# Patient Record
Sex: FEMALE | Race: WHITE | Employment: OTHER | URBAN - METROPOLITAN AREA
[De-identification: names, ages, dates, MRNs, and addresses within clinical notes are randomized per-mention and may not be internally consistent; named-entity substitution may affect disease eponyms.]

---

## 2017-01-21 ENCOUNTER — HOSPITAL ENCOUNTER (EMERGENCY)
Age: 72
Discharge: HOME | End: 2017-01-21
Attending: EMERGENCY MEDICINE | Admitting: EMERGENCY MEDICINE
Payer: MEDICARE

## 2017-01-21 VITALS
OXYGEN SATURATION: 97 % | SYSTOLIC BLOOD PRESSURE: 208 MMHG | RESPIRATION RATE: 18 BRPM | WEIGHT: 155.42 LBS | HEIGHT: 60 IN | TEMPERATURE: 97.8 F | DIASTOLIC BLOOD PRESSURE: 108 MMHG | HEART RATE: 93 BPM | BODY MASS INDEX: 30.51 KG/M2

## 2017-01-21 DIAGNOSIS — N39.0 URINARY TRACT INFECTION WITH HEMATURIA, SITE UNSPECIFIED: Primary | ICD-10-CM

## 2017-01-21 DIAGNOSIS — R31.9 URINARY TRACT INFECTION WITH HEMATURIA, SITE UNSPECIFIED: Primary | ICD-10-CM

## 2017-01-21 LAB
APPEARANCE UR: ABNORMAL
BACTERIA URNS QL MICRO: ABNORMAL /HPF
BILIRUB UR QL: NEGATIVE
COLOR UR: ABNORMAL
EPITH CASTS URNS QL MICRO: ABNORMAL /LPF
GLUCOSE UR STRIP.AUTO-MCNC: NEGATIVE MG/DL
HGB UR QL STRIP: ABNORMAL
KETONES UR QL STRIP.AUTO: NEGATIVE MG/DL
LEUKOCYTE ESTERASE UR QL STRIP.AUTO: ABNORMAL
NITRITE UR QL STRIP.AUTO: NEGATIVE
PH UR STRIP: 6 [PH] (ref 5–8)
PROT UR STRIP-MCNC: 30 MG/DL
RBC #/AREA URNS HPF: ABNORMAL /HPF (ref 0–5)
SP GR UR REFRACTOMETRY: 1 (ref 1–1.03)
UA: UC IF INDICATED,UAUC: ABNORMAL
UROBILINOGEN UR QL STRIP.AUTO: 0.2 EU/DL (ref 0.2–1)
WBC URNS QL MICRO: ABNORMAL /HPF (ref 0–4)

## 2017-01-21 PROCEDURE — 96372 THER/PROPH/DIAG INJ SC/IM: CPT

## 2017-01-21 PROCEDURE — 74011250636 HC RX REV CODE- 250/636: Performed by: EMERGENCY MEDICINE

## 2017-01-21 PROCEDURE — 87086 URINE CULTURE/COLONY COUNT: CPT | Performed by: EMERGENCY MEDICINE

## 2017-01-21 PROCEDURE — 74011000250 HC RX REV CODE- 250: Performed by: EMERGENCY MEDICINE

## 2017-01-21 PROCEDURE — 99283 EMERGENCY DEPT VISIT LOW MDM: CPT

## 2017-01-21 PROCEDURE — 87186 SC STD MICRODIL/AGAR DIL: CPT | Performed by: EMERGENCY MEDICINE

## 2017-01-21 PROCEDURE — 81001 URINALYSIS AUTO W/SCOPE: CPT | Performed by: EMERGENCY MEDICINE

## 2017-01-21 PROCEDURE — 87077 CULTURE AEROBIC IDENTIFY: CPT | Performed by: EMERGENCY MEDICINE

## 2017-01-21 RX ORDER — CEPHALEXIN 500 MG/1
500 CAPSULE ORAL 3 TIMES DAILY
Qty: 21 CAP | Refills: 0 | Status: SHIPPED | OUTPATIENT
Start: 2017-01-21 | End: 2017-01-28

## 2017-01-21 RX ADMIN — LIDOCAINE HYDROCHLORIDE 1 G: 10 INJECTION, SOLUTION EPIDURAL; INFILTRATION; INTRACAUDAL; PERINEURAL at 03:46

## 2017-01-21 NOTE — ED NOTES
About 2200 patient started feeling burning and frequency, started drinking lots of water and cranberrry juice. Has had UTIs in the past with similar symptoms. Patient came in because she could not go to the bathroom.

## 2017-01-21 NOTE — ED PROVIDER NOTES
HPI Comments: Ivelisse Carey is a 66 y.o. female who presents ambulatory to the ED c/o dysuria, urinary frequency and urgency for the past 4 hours. She notes hx of similar symptoms with prior UTIs, most recent infection occurring 4 months ago. Pt specifically denies fevers, chills, nausea, vomiting, vaginal discharge, or abdominal pain    PCP: Rufino Connell MD    There are no other complaints, changes or physical findings at this time. The history is provided by the patient.         Past Medical History:   Diagnosis Date    ADD (attention deficit disorder)     Asthma      chronic cough    Cataracts, bilateral     Depression with anxiety     Essential hypertension, benign 10/8/2009    GERD (gastroesophageal reflux disease)     History of uterine fibroid     Hyperlipidemia     Ocular migraine     YOMAIRA (obstructive sleep apnea)      does not use her c-pap    Osteoarthrosis involving multiple sites 3/4/2013    Osteopenia 10/8/2009    Rosacea     Stroke ([facility] [address]     Urge incontinence of urine     Urolithiasis     Urolithiasis        Past Surgical History:   Procedure Laterality Date    Endoscopy, colon, diagnostic       2009  clear    Hx appendectomy      Hx urological       stone removal    Hx dilation and curettage  1988    Hx tonsillectomy  1947    Pr breast surgery procedure unlisted  1991     biopsy: benign left    Hx orthopaedic Right 03/2013     TKR    Hx orthopaedic Left 09/2013     TKR         Family History:   Problem Relation Age of Onset    Arthritis-rheumatoid Mother     Dementia Mother     Cancer Father      prostate,liver    Hypertension Brother     Arthritis-rheumatoid Brother      oste0    Cancer Brother     Hypertension Brother     Hypertension Brother     Stroke Maternal Aunt     Malignant Hyperthermia Neg Hx     Pseudocholinesterase Deficiency Neg Hx     Delayed Awakening Neg Hx     Post-op Nausea/Vomiting Neg Hx     Emergence Delirium Neg Hx     Post-op Cognitive Dysfunction Neg Hx        Social History     Social History    Marital status:      Spouse name: N/A    Number of children: N/A    Years of education: N/A     Occupational History    Not on file. Social History Main Topics    Smoking status: Former Smoker     Years: 3.00     Quit date: 9/16/1963    Smokeless tobacco: Never Used    Alcohol use No    Drug use: No    Sexual activity: No     Other Topics Concern    Not on file     Social History Narrative         ALLERGIES: Review of patient's allergies indicates no known allergies. Review of Systems   Constitutional: Negative for activity change, appetite change, chills, fatigue and fever. HENT: Negative. Negative for congestion, rhinorrhea and sore throat. Respiratory: Negative. Negative for cough, shortness of breath and wheezing. Cardiovascular: Negative. Negative for chest pain and leg swelling. Gastrointestinal: Negative. Negative for abdominal distention, abdominal pain, constipation, diarrhea, nausea and vomiting. Endocrine: Negative. Genitourinary: Positive for dysuria, frequency and urgency. Negative for difficulty urinating, menstrual problem, vaginal bleeding and vaginal discharge. Musculoskeletal: Negative. Negative for arthralgias, joint swelling and myalgias. Skin: Negative. Negative for rash. Neurological: Negative. Negative for dizziness, weakness, light-headedness and headaches. Psychiatric/Behavioral: Negative. Vitals:    01/21/17 0211   BP: (!) 208/108   Pulse: 93   Resp: 18   Temp: 97.8 °F (36.6 °C)   SpO2: 97%   Weight: 70.5 kg (155 lb 6.8 oz)   Height: 5' (1.524 m)            Physical Exam   Constitutional: She is oriented to person, place, and time. She appears well-developed and well-nourished. HENT:   Head: Atraumatic. Eyes: EOM are normal.   Cardiovascular: Normal rate, regular rhythm, normal heart sounds and intact distal pulses.   Exam reveals no gallop and no friction rub. No murmur heard. Pulmonary/Chest: Effort normal and breath sounds normal. No respiratory distress. She has no wheezes. She has no rales. She exhibits no tenderness. Abdominal: Soft. Bowel sounds are normal. She exhibits no distension and no mass. There is no tenderness. There is no rebound and no guarding. Musculoskeletal: Normal range of motion. She exhibits no edema or tenderness. Neurological: She is oriented to person, place, and time. Skin: Skin is warm. Psychiatric: She has a normal mood and affect. Nursing note and vitals reviewed. MDM  Number of Diagnoses or Management Options  Diagnosis management comments: Likely UTI, cystitis       Amount and/or Complexity of Data Reviewed  Clinical lab tests: ordered and reviewed  Review and summarize past medical records: yes    Patient Progress  Patient progress: stable    ED Course       Procedures    LABORATORY TESTS:  Recent Results (from the past 12 hour(s))   URINALYSIS W/ REFLEX CULTURE    Collection Time: 01/21/17  2:28 AM   Result Value Ref Range    Color YELLOW/STRAW      Appearance CLOUDY (A) CLEAR      Specific gravity 1.005 1.003 - 1.030      pH (UA) 6.0 5.0 - 8.0      Protein 30 (A) NEG mg/dL    Glucose NEGATIVE  NEG mg/dL    Ketone NEGATIVE  NEG mg/dL    Bilirubin NEGATIVE  NEG      Blood LARGE (A) NEG      Urobilinogen 0.2 0.2 - 1.0 EU/dL    Nitrites NEGATIVE  NEG      Leukocyte Esterase LARGE (A) NEG      WBC 20-50 0 - 4 /hpf    RBC 20-50 0 - 5 /hpf    Epithelial cells FEW FEW /lpf    Bacteria 1+ (A) NEG /hpf    UA:UC IF INDICATED URINE CULTURE ORDERED (A) CNI         MEDICATIONS GIVEN:  Medications   cefTRIAXone (ROCEPHIN) 1 g in lidocaine (PF) (XYLOCAINE) 10 mg/mL (1 %) IM injection (not administered)       IMPRESSION:  1. Urinary tract infection with hematuria, site unspecified        PLAN:  1.  Discharge home   Current Discharge Medication List      START taking these medications    Details   cephALEXin (Uri Frausto) 500 mg capsule Take 1 Cap by mouth three (3) times daily for 7 days. Qty: 21 Cap, Refills: 0         CONTINUE these medications which have NOT CHANGED    Details   aspirin (ASPIRIN) 325 mg tablet Take 325 mg by mouth daily. butalbital-acetaminophen-caffeine (FIORICET, ESGIC) -40 mg per tablet Take 1 Tab by mouth. simvastatin (ZOCOR) 40 mg tablet 40 mg.      methylphenidate ER 36 mg 24 hr tab Take 36 mg by mouth every morning. fluticasone (FLONASE) 50 mcg/actuation nasal spray 2 Sprays by Both Nostrils route daily. losartan (COZAAR) 100 mg tablet Take 100 mg by mouth every evening. meclizine (ANTIVERT) 25 mg tablet Take 1 Tab by mouth three (3) times daily as needed for 30 doses. Qty: 30 Tab, Refills: 0    Associated Diagnoses: Vertigo           2. Follow-up Information     Follow up With Details Comments Adrian Montaño MD In 1 week  [address] [address]  [phone]      \[facility]\"" EMERGENCY DEPT  As needed, If symptoms worsen [facility]  [address] [place] [address]  [phone]      Return to ED if worse     DISCHARGE NOTE  3:28 AM  The patient has been re-evaluated and is ready for discharge. Reviewed available results with patient. Counseled pt on diagnosis and care plan. Pt has expressed understanding, and all questions have been answered. Pt agrees with plan and agrees to F/U as recommended, or return to the ED if their sxs worsen. Discharge instructions have been provided and explained to the pt, along with reasons to return to the ED. This note is prepared by Elsa Ellison, acting as Scribe for Angie Franco MD.    Angie Franco MD: The scribe's documentation has been prepared under my direction and personally reviewed by me in its entirety.  I confirm that the note above accurately reflects all work, treatment, procedures, and medical decision making performed by me.

## 2017-01-21 NOTE — ED NOTES
Medicated with antibiotics IM as ordered. Provider at bedside for dispo and follow up. Discharge plan reviewed and paperwork signed, teaching demonstrated and read back, pain level within manageable comfortable limits, ambulatory to exit, gait steady, safety maintained.

## 2017-01-21 NOTE — DISCHARGE INSTRUCTIONS
Urinary Tract Infection in Women: Care Instructions  Your Care Instructions    A urinary tract infection, or UTI, is a general term for an infection anywhere between the kidneys and the urethra (where urine comes out). Most UTIs are bladder infections. They often cause pain or burning when you urinate. UTIs are caused by bacteria and can be cured with antibiotics. Be sure to complete your treatment so that the infection goes away. Follow-up care is a key part of your treatment and safety. Be sure to make and go to all appointments, and call your doctor if you are having problems. It's also a good idea to know your test results and keep a list of the medicines you take. How can you care for yourself at home? · Take your antibiotics as directed. Do not stop taking them just because you feel better. You need to take the full course of antibiotics. · Drink extra water and other fluids for the next day or two. This may help wash out the bacteria that are causing the infection. (If you have kidney, heart, or liver disease and have to limit fluids, talk with your doctor before you increase your fluid intake.)  · Avoid drinks that are carbonated or have caffeine. They can irritate the bladder. · Urinate often. Try to empty your bladder each time. · To relieve pain, take a hot bath or lay a heating pad set on low over your lower belly or genital area. Never go to sleep with a heating pad in place. To prevent UTIs  · Drink plenty of water each day. This helps you urinate often, which clears bacteria from your system. (If you have kidney, heart, or liver disease and have to limit fluids, talk with your doctor before you increase your fluid intake.)  · Consider adding cranberry juice to your diet. · Urinate when you need to. · Urinate right after you have sex. · Change sanitary pads often. · Avoid douches, bubble baths, feminine hygiene sprays, and other feminine hygiene products that have deodorants.   · After going to the bathroom, wipe from front to back. When should you call for help? Call your doctor now or seek immediate medical care if:  · Symptoms such as fever, chills, nausea, or vomiting get worse or appear for the first time. · You have new pain in your back just below your rib cage. This is called flank pain. · There is new blood or pus in your urine. · You have any problems with your antibiotic medicine. Watch closely for changes in your health, and be sure to contact your doctor if:  · You are not getting better after taking an antibiotic for 2 days. · Your symptoms go away but then come back. Where can you learn more? Go to http://violet-pa.info/. Enter A752 in the search box to learn more about \"Urinary Tract Infection in Women: Care Instructions. \"  Current as of: August 12, 2016  Content Version: 11.1  © 4638-0435 [employer]. Care instructions adapted under license by [employer] (which disclaims liability or warranty for this information). If you have questions about a medical condition or this instruction, always ask your healthcare professional. [address] any warranty or liability for your use of this information.

## 2017-01-23 LAB
BACTERIA SPEC CULT: NORMAL
CC UR VC: NORMAL
SERVICE CMNT-IMP: NORMAL

## 2017-03-27 ENCOUNTER — DOCUMENTATION ONLY (OUTPATIENT)
Dept: SLEEP MEDICINE | Age: 72
End: 2017-03-27

## 2017-03-27 NOTE — PROGRESS NOTES
Patient came in office not happy with cpap or mask, advised patient to contact dme [facility]). Patient not sure if dme could help offered to make appointment with doctor for other treatment options, patient refused. Advised patient will call roxana at [facility]  And have her call her back. Shazia Patel at [facility], she will call about about mask issue.

## 2017-04-12 ENCOUNTER — TELEPHONE (OUTPATIENT)
Dept: SLEEP MEDICINE | Age: 72
End: 2017-04-12

## 2017-04-12 NOTE — TELEPHONE ENCOUNTER
Patient called to inform us that DME we referred to her ([address] was horrible. She said she called to request for mask of her choice and she was asked to called [facility] office and they could not help her there. [facility] office told her that they should not have asked her to call here because they would not be able to help. She was very upset that she was transferred back and forth to [facility] office. Finally she was scheduled for mask fitting and then she asked if they could check her machine. They told her that she needed to make another appointment for that. Patient called to let us know how terrible service [facility] offered so that we are aware of how poor their service is. She said because of the DME she already has made an appointment with different Sleep Dr and would want to change the DME as well. I told her if she was satisfied with Dr Yovany Simpsno, then we can re-send the order to different company [facility]). She said at this time she will stick with her new plan. If needed be she will call back again. Note: We had initially faxed her order to [facility] and then informed her that we have faxed the order to [facility]. She wanted order be faxed to [address] Then re-faxed it to [facility].

## 2017-05-05 NOTE — TELEPHONE ENCOUNTER
Briefed Shiela Telles at [facility] about patient complaint and decided to change Sleep Dr and DME. Shiela Telles said she would looked into what happened. Monitor summary  SR   [id number]  0.16/0.08/0.36

## 2018-03-08 ENCOUNTER — HOSPITAL ENCOUNTER (OUTPATIENT)
Dept: LAB | Age: 73
Discharge: HOME | End: 2018-03-08

## 2018-08-03 ENCOUNTER — HOSPITAL ENCOUNTER (OUTPATIENT)
Dept: MRI IMAGING | Age: 73
Discharge: HOME | End: 2018-08-03
Attending: ORTHOPAEDIC SURGERY
Payer: MEDICARE

## 2018-08-03 DIAGNOSIS — M25.511 ACUTE PAIN OF RIGHT SHOULDER: ICD-10-CM

## 2018-08-03 DIAGNOSIS — S46.011A TRAUMATIC TEAR OF RIGHT ROTATOR CUFF, INITIAL ENCOUNTER: ICD-10-CM

## 2018-08-03 PROCEDURE — 73221 MRI JOINT UPR EXTREM W/O DYE: CPT

## 2020-02-13 NOTE — PERIOP NOTES
[address]  Endoscopy Preprocedure Instructions      1. On the day of your surgery, please report to registration located on the 2nd floor of the  [facility]. yes    2. You must have a responsible adult to drive you to the hospital, stay at the hospital during your procedure and drive you home. If they leave your procedure will not be started (no exceptions). yes    3. Do not have anything to eat or drink (including water, gum, mints, coffee, and juice) after midnight. This does not apply to the medications you were instructed to take by your physician. yesIf you are currently taking Plavix, Coumadin, Aspirin, or other blood-thinning agents, contact your physician for special instructions. yes,    4. If you are having a procedure that requires bowel prep: We recommend that you have only clear liquids the day before your procedure and begin your bowel prep by 5:00 pm.  You may continue to drink clear liquids until midnight. If for any reason you are not able to complete your prep please notify your physician immediately. yes    5. Have a list of all current medications, including vitamins, herbal supplements and any other over the counter medications. yes    6. If you wear glasses, contacts, dentures and/or hearing aids, they may be removed prior to procedure, please bring a case to store them in. yes    7. You should understand that if you do not follow these instructions your procedure may be cancelled. If your physical condition changes (I.e. fever, cold or flu) please contact your doctor as soon as possible. 8. It is important that you be on time.   If for any reason you are unable to keep your appointment please call [phone] the day of or your physicians office prior to your scheduled procedure

## 2020-02-14 ENCOUNTER — HOSPITAL ENCOUNTER (OUTPATIENT)
Age: 75
Setting detail: OUTPATIENT SURGERY
Discharge: HOME | End: 2020-02-14
Attending: SPECIALIST | Admitting: SPECIALIST
Payer: MEDICARE

## 2020-02-14 ENCOUNTER — ANESTHESIA EVENT (OUTPATIENT)
Dept: ENDOSCOPY | Age: 75
End: 2020-02-14
Payer: MEDICARE

## 2020-02-14 ENCOUNTER — ANESTHESIA (OUTPATIENT)
Dept: ENDOSCOPY | Age: 75
End: 2020-02-14
Payer: MEDICARE

## 2020-02-14 VITALS
OXYGEN SATURATION: 97 % | HEIGHT: 60 IN | BODY MASS INDEX: 29 KG/M2 | RESPIRATION RATE: 17 BRPM | SYSTOLIC BLOOD PRESSURE: 138 MMHG | TEMPERATURE: 97.9 F | HEART RATE: 69 BPM | DIASTOLIC BLOOD PRESSURE: 80 MMHG | WEIGHT: 147.71 LBS

## 2020-02-14 PROCEDURE — 74011250636 HC RX REV CODE- 250/636: Performed by: NURSE ANESTHETIST, CERTIFIED REGISTERED

## 2020-02-14 PROCEDURE — 88305 TISSUE EXAM BY PATHOLOGIST: CPT

## 2020-02-14 PROCEDURE — 77030013992 HC SNR POLYP ENDOSC BSC -B: Performed by: SPECIALIST

## 2020-02-14 PROCEDURE — 77030021593 HC FCPS BIOP ENDOSC BSC -A: Performed by: SPECIALIST

## 2020-02-14 PROCEDURE — 74011000250 HC RX REV CODE- 250: Performed by: NURSE ANESTHETIST, CERTIFIED REGISTERED

## 2020-02-14 PROCEDURE — 76060000031 HC ANESTHESIA FIRST 0.5 HR: Performed by: SPECIALIST

## 2020-02-14 PROCEDURE — 76040000019: Performed by: SPECIALIST

## 2020-02-14 RX ORDER — DEXTROMETHORPHAN/PSEUDOEPHED 2.5-7.5/.8
1.2 DROPS ORAL
Status: DISCONTINUED | OUTPATIENT
Start: 2020-02-14 | End: 2020-02-14 | Stop reason: HOSPADM

## 2020-02-14 RX ORDER — SODIUM CHLORIDE 9 MG/ML
INJECTION, SOLUTION INTRAVENOUS
Status: DISCONTINUED | OUTPATIENT
Start: 2020-02-14 | End: 2020-02-14 | Stop reason: HOSPADM

## 2020-02-14 RX ORDER — ONDANSETRON 2 MG/ML
INJECTION INTRAMUSCULAR; INTRAVENOUS AS NEEDED
Status: DISCONTINUED | OUTPATIENT
Start: 2020-02-14 | End: 2020-02-14 | Stop reason: HOSPADM

## 2020-02-14 RX ORDER — PROPOFOL 10 MG/ML
INJECTION, EMULSION INTRAVENOUS AS NEEDED
Status: DISCONTINUED | OUTPATIENT
Start: 2020-02-14 | End: 2020-02-14 | Stop reason: HOSPADM

## 2020-02-14 RX ORDER — SODIUM CHLORIDE 9 MG/ML
50 INJECTION, SOLUTION INTRAVENOUS CONTINUOUS
Status: DISCONTINUED | OUTPATIENT
Start: 2020-02-14 | End: 2020-02-14 | Stop reason: HOSPADM

## 2020-02-14 RX ORDER — MIDAZOLAM HYDROCHLORIDE 1 MG/ML
.25-5 INJECTION, SOLUTION INTRAMUSCULAR; INTRAVENOUS AS NEEDED
Status: DISCONTINUED | OUTPATIENT
Start: 2020-02-14 | End: 2020-02-14 | Stop reason: HOSPADM

## 2020-02-14 RX ORDER — PROPOFOL 10 MG/ML
INJECTION, EMULSION INTRAVENOUS
Status: DISCONTINUED | OUTPATIENT
Start: 2020-02-14 | End: 2020-02-14 | Stop reason: HOSPADM

## 2020-02-14 RX ORDER — FENTANYL CITRATE 50 UG/ML
25 INJECTION, SOLUTION INTRAMUSCULAR; INTRAVENOUS AS NEEDED
Status: DISCONTINUED | OUTPATIENT
Start: 2020-02-14 | End: 2020-02-14 | Stop reason: HOSPADM

## 2020-02-14 RX ORDER — NALOXONE HYDROCHLORIDE 0.4 MG/ML
0.4 INJECTION, SOLUTION INTRAMUSCULAR; INTRAVENOUS; SUBCUTANEOUS
Status: DISCONTINUED | OUTPATIENT
Start: 2020-02-14 | End: 2020-02-14 | Stop reason: HOSPADM

## 2020-02-14 RX ORDER — FLUMAZENIL 0.1 MG/ML
0.2 INJECTION INTRAVENOUS
Status: DISCONTINUED | OUTPATIENT
Start: 2020-02-14 | End: 2020-02-14 | Stop reason: HOSPADM

## 2020-02-14 RX ORDER — LIDOCAINE HYDROCHLORIDE 20 MG/ML
INJECTION, SOLUTION EPIDURAL; INFILTRATION; INTRACAUDAL; PERINEURAL AS NEEDED
Status: DISCONTINUED | OUTPATIENT
Start: 2020-02-14 | End: 2020-02-14 | Stop reason: HOSPADM

## 2020-02-14 RX ADMIN — ONDANSETRON HYDROCHLORIDE 4 MG: 2 SOLUTION INTRAMUSCULAR; INTRAVENOUS at 09:31

## 2020-02-14 RX ADMIN — SODIUM CHLORIDE: 9 INJECTION, SOLUTION INTRAVENOUS at 09:20

## 2020-02-14 RX ADMIN — PROPOFOL 100 MG: 10 INJECTION, EMULSION INTRAVENOUS at 09:33

## 2020-02-14 RX ADMIN — LIDOCAINE HYDROCHLORIDE 40 MG: 20 INJECTION, SOLUTION INTRAVENOUS at 09:39

## 2020-02-14 RX ADMIN — PROPOFOL 140 MCG/KG/MIN: 10 INJECTION, EMULSION INTRAVENOUS at 09:32

## 2020-02-14 NOTE — ROUTINE PROCESS
Real Cristiane  1945  [id number]    Situation:  Verbal report received from: [facility]  Procedure: Procedure(s):  COLONOSCOPY  ESOPHAGOGASTRODUODENOSCOPY (EGD)  ESOPHAGOGASTRODUODENAL (EGD) BIOPSY  ENDOSCOPIC POLYPECTOMY    Background:    Preoperative diagnosis: PERSONAL HX OF COLONIC POLYPS  Postoperative diagnosis: Gastritis  hemorrhoids  Transverse polyp  Diverticulosis    :  Dr. Villalta Peer  Assistant(s): Endoscopy Technician-1: Claudia Pepe  Endoscopy RN-1: Nandini Preciado RN    Specimens:   ID Type Source Tests Collected by Time Destination   1 : biopsy Preservative   Lubna Chaudhry MD 2/14/2020 6864 Pathology   2 : polyp Preservative Colon, Transverse  Lubna Chaudhry MD 2/14/2020 0945 Pathology     H. Pylori  no    Assessment:  Intra-procedure medications     Anesthesia gave intra-procedure sedation and medications, see anesthesia flow sheet yes    Intravenous fluids: NS@ KVO     Vital signs stable     Abdominal assessment: round and soft     Recommendation:  Discharge patient per MD order.   Family or Friend   Permission to share finding with family or friend yes

## 2020-02-14 NOTE — ANESTHESIA PREPROCEDURE EVALUATION
Relevant Problems   No relevant active problems       Anesthetic History     PONV          Review of Systems / Medical History  Patient summary reviewed, nursing notes reviewed and pertinent labs reviewed    Pulmonary        Sleep apnea    Asthma        Neuro/Psych       CVA  TIA and psychiatric history     Cardiovascular    Hypertension                   GI/Hepatic/Renal     GERD           Endo/Other        Arthritis     Other Findings            Physical Exam    Airway  Mallampati: II  TM Distance: 4 - 6 cm  Neck ROM: normal range of motion        Cardiovascular    Rhythm: regular  Rate: normal         Dental  No notable dental hx       Pulmonary  Breath sounds clear to auscultation               Abdominal         Other Findings            Anesthetic Plan    ASA: 3  Anesthesia type: MAC          Induction: Intravenous  Anesthetic plan and risks discussed with: Patient      Hx of ponv with egd/colonoscopy

## 2020-02-14 NOTE — INTERVAL H&P NOTE
Pre-Endoscopy H&P Update  Chief complaint/HPI/ROS:  The indication for the procedure, the patient's history and the patient's current medications are reviewed prior to the procedure and that data is reported on the H&P to which this document is attached. Any significant complaints with regard to organ systems will be noted, and if not mentioned then a review of systems is not contributory.   Past Medical History:   Diagnosis Date    ADD (attention deficit disorder)     Asthma     chronic cough    Cataracts, bilateral     Depression with anxiety     Essential hypertension, benign 10/8/2009    GERD (gastroesophageal reflux disease)     History of uterine fibroid     Hyperlipidemia     Ocular migraine     YOMAIRA (obstructive sleep apnea)     bipap    Osteoarthrosis involving multiple sites 3/4/2013    Osteopenia 10/8/2009    Rosacea     Stroke ([address]     Urge incontinence of urine     Urolithiasis     Urolithiasis       Past Surgical History:   Procedure Laterality Date    BREAST SURGERY PROCEDURE UNLISTED      biopsy: benign left    ENDOSCOPY, COLON, DIAGNOSTIC       Dr.Lyons martínez    HX APPENDECTOMY      HX DILATION AND CURETTAGE      HX ORTHOPAEDIC Right 2013    TKR    HX ORTHOPAEDIC Left 2013    TKR    HX TONSILLECTOMY      HX UROLOGICAL      stone removal     Social   Social History     Tobacco Use    Smoking status: Former Smoker     Years: 3.00     Last attempt to quit: 1963     Years since quittin.3    Smokeless tobacco: Never Used   Substance Use Topics    Alcohol use: No     Alcohol/week: 0.0 standard drinks      Family History   Problem Relation Age of Onset    Arthritis-rheumatoid Mother     Dementia Mother     Cancer Father         prostate,liver    Hypertension Brother     Arthritis-rheumatoid Brother         oste0    Cancer Brother     Hypertension Brother     Hypertension Brother     Stroke Maternal Aunt     Malignant Hyperthermia Neg Hx  Pseudocholinesterase Deficiency Neg Hx     Delayed Awakening Neg Hx     Post-op Nausea/Vomiting Neg Hx     Emergence Delirium Neg Hx     Post-op Cognitive Dysfunction Neg Hx       No Known Allergies   Prior to Admission Medications   Prescriptions Last Dose Informant Patient Reported? Taking?   aspirin (ASPIRIN) 325 mg tablet 2020  Yes Yes   Sig: Take 325 mg by mouth daily. butalbital-acetaminophen-caffeine (FIORICET, ESGIC) -40 mg per tablet 2020 at Unknown time  Yes Yes   Sig: Take 1 Tab by mouth. fluticasone (FLONASE) 50 mcg/actuation nasal spray Not Taking at Unknown time Self Yes No   Si Sprays by Both Nostrils route daily. losartan (COZAAR) 100 mg tablet 2020 at Unknown time Self Yes Yes   Sig: Take 100 mg by mouth every evening. meclizine (ANTIVERT) 25 mg tablet 2020 at Unknown time Self No Yes   Sig: Take 1 Tab by mouth three (3) times daily as needed for 30 doses. methylphenidate ER 36 mg 24 hr tab Not Taking at Unknown time Self Yes No   Sig: Take 36 mg by mouth every morning. simvastatin (ZOCOR) 40 mg tablet Not Taking at Unknown time  Yes No   Si mg. Facility-Administered Medications: None       PHYSICAL EXAM:  The patient is examined immediately prior to the procedure. Visit Vitals  /85   Pulse 76   Temp 98.4 °F (36.9 °C)   Resp 14   Ht 5' (1.524 m)   Wt 67 kg (147 lb 11.3 oz)   SpO2 96%   BMI 28.85 kg/m²     Gen: Appears comfortable, no distress. Pulm: comfortable respirations with no abnormal audible breath sounds  HEART: well perfused, no abnormal audible heart sounds  GI: abdomen flat. PLAN:  Informed consent discussion held, patient afforded an opportunity to ask questions and all questions answered. After being advised of the risks, benefits, and alternatives, the patient requested that we proceed and indicated so on a written consent form. Will proceed with procedure as planned.   Kimberly Mayer MD

## 2020-02-14 NOTE — ADDENDUM NOTE
Addendum  created 02/14/20 1012 by Pia Nunez CRNA    Intraprocedure Flowsheets edited, Intraprocedure Meds edited, Orders acknowledged in Narrator

## 2020-02-14 NOTE — PERIOP NOTES
Received report from [address] [place] [address], see anesthesia note. Scopes all washed at bedside by Abhishek Pierre. Pt transferred to recovery and report given to Armin Garcia regarding procedures, diagnosis, etc. Family brought to bedside and updated on POC. VSS, abdoman soft.

## 2020-02-14 NOTE — DISCHARGE INSTRUCTIONS
[facility] GEOFF Carmona MD  [phone]      February 14, 2020    Bobby Ward  YOB: 1945    COLONOSCOPY DISCHARGE INSTRUCTIONS    If there is redness at IV site you should apply warm compress to area. If redness or soreness persist contact Dr. Kenroy Carmona' or your primary care doctor. There may be a slight amount of blood passed from the rectum. Gaseous discomfort may develop, but walking, belching will help relieve this. You may not operate a vehicle for 12 hours  You may not operate machinery or dangerous appliances for rest of today  You may not drink alcoholic beverages for 12 hours  Avoid making any critical decisions for 24 hours    DIET:  You may resume your normal diet, but some patients find that heavy or large meals may lead to indigestion or vomiting. I suggest a light meal as first food intake. MEDICATIONS:  The use of some over-the-counter pain medication may lead to bleeding after colon biopsies or polyp removal.  Tylenol (also called acetaminophen) is safe to take even if you have had colonoscopy with polyp removal.  Based on the procedure you had today you may not safely take aspirin or aspirin-like products for the next ten (10) days. Remember that Tylenol (also called acetaminophen) is safe to take after colonoscopy even if you have had biopsies or polyps removed. ACTIVITY:  You may resume your normal household activities, but it is recommended that you spend the remainder of the day resting -  avoid any strenuous activity. CALL DR. Erica Le' OFFICE IF:  Increasing pain, nausea, vomiting  Abdominal distension (swelling)  Significant new or increased bleeding (oral or rectal)  Fever/Chills  Chest pain/shortness of breath                       Additional instructions:   Hold aspirin 10 days. We found one small polyp and removed it.   The stomach has mild redness but no ulcer or sulema.  I took biopsies from the stomach to look for the ulcer bacteria and I'll contact you with the biopsy results in about a week. It was an honor to be your doctor today. Please let me or my office staff know if you have any feedback about today's procedure. Kevin Munoz MD    Colonoscopy saves lives, and can prevent colon cancer. Everyone aged 48 or older needs colonoscopy.   Tell your family and friends: get the test!

## 2020-02-14 NOTE — INTERVAL H&P NOTE
On evaluation she complains of epigastric discomfort improved with meals, personal history of PUD, personal history of helicobacter pylori. Will perform EGD at same session of colonoscopy.   Jose Alejandro De La Cruz MD

## 2020-02-14 NOTE — PROCEDURES
[address]  [phone]      2020    Esophagogastroduodenoscopy & Colonoscopy Procedure Note  Theo David  : 1945  [address] [place] [address] Medical Record Number: [id number]      Indications:    Abdominal pain, epigastric Screening Colonoscopy   Referring Physician:  Sebas Szymanski MD  Anesthesia/Sedation: Conscious Sedation/Moderate Sedation/MAC  Endoscopist:  Dr. Kate Spivey  Complications:  None  Estimated Blood Loss:  None    Permit:  The indications, risks, benefits and alternatives were reviewed with the patient or their decision maker who was provided an opportunity to ask questions and all questions were answered. The specific risks of esophagogastroduodenoscopy with conscious sedation were reviewed, including but not limited to anesthetic complication, bleeding, adverse drug reaction, missed lesion, infection, IV site reactions, and intestinal perforation which would lead to the need for surgical repair. Alternatives to EGD and colonoscopy including radiographic imaging, observation without testing, or laboratory testing were reviewed as well as the limitations of those alternatives discussed. After considering the options and having all their questions answered, the patient or their decision maker provided both verbal and written consent to proceed. -----------EGD------------   Procedure in Detail:  After obtaining informed consent, positioning of the patient in the left lateral decubitus position, and conduction of a pre-procedure pause or \"time out\" the endoscope was introduced into the mouth and advanced to the duodenum. A careful inspection was made, and findings or interventions are described below. Findings:   Esophagus:normal  Stomach: Gastric erythema without significant erosion or ulceration.   Cold forceps biopsies taken for histology given her history of helicobacter pylori. Duodenum/jejunum: normal        ----------Colonoscopy-----------    Procedure in Detail:  After obtaining informed consent, positioning of the patient in the left lateral decubitus position, and conduction of a pre-procedure pause or \"time out\" the endoscope was introduced into the anus and advanced to the cecum, which was identified by the ileocecal valve and appendiceal orifice. The quality of the colonic preparation was good. A careful inspection was made as the colonoscope was withdrawn, findings and interventions are described below. Findings:   3mm polyp in the transverse colon removed with cold snare and all samples retrieved. There is diverticulosis in the sigmoid colon without complications such as bleeding, inflammatory change, or luminal narrowing. In the rectum, medium internal hemorrhoids are noted without bleeding.      ------------------------------  Specimens:    See above    Complications:   None; patient tolerated the procedure well. Impressions:  EGD:  Gastritis. Colonoscopy: Colon polyp, diverticulosis, and hemorrhoids. Recommendations:     - Await pathology. Thank you for entrusting me with this patient's care. Please do not hesitate to contact me with any questions or if I can be of assistance with any of your other patients' GI needs. Signed By: Margaret Angeles MD                        February 14, 2020    Surgical assistant none. Implants none unless specified.

## 2020-02-14 NOTE — ANESTHESIA POSTPROCEDURE EVALUATION
Procedure(s):  COLONOSCOPY  ESOPHAGOGASTRODUODENOSCOPY (EGD)  ESOPHAGOGASTRODUODENAL (EGD) BIOPSY  ENDOSCOPIC POLYPECTOMY. MAC    Anesthesia Post Evaluation      Multimodal analgesia: multimodal analgesia used between 6 hours prior to anesthesia start to PACU discharge  Patient location during evaluation: PACU  Patient participation: complete - patient participated  Level of consciousness: awake and alert  Pain management: adequate  Airway patency: patent  Anesthetic complications: no  Cardiovascular status: acceptable  Respiratory status: acceptable  Hydration status: acceptable  Post anesthesia nausea and vomiting:  none      Vitals Value Taken Time   /83 2/14/2020 10:05 AM   Temp 36.6 °C (97.9 °F) 2/14/2020 10:00 AM   Pulse 78 2/14/2020 10:06 AM   Resp 14 2/14/2020 10:06 AM   SpO2 97 % 2/14/2020 10:06 AM   Vitals shown include unvalidated device data.

## 2020-02-14 NOTE — H&P
66 y.o. female for open access colonoscopy for screening   Additional data for completion of the targeted pre-endoscopy H&P will be provided under 'H&P interval notes'. Please see that document which will be attached to this.   Magdiel Segundo MD  Last colonoscopy [address]  [place] small TA

## 2022-08-17 ENCOUNTER — OFFICE VISIT (OUTPATIENT)
Dept: ORTHOPEDIC SURGERY | Age: 77
End: 2022-08-17
Payer: MEDICARE

## 2022-08-17 DIAGNOSIS — M79.641 RIGHT HAND PAIN: ICD-10-CM

## 2022-08-17 DIAGNOSIS — M79.642 BILATERAL HAND PAIN: ICD-10-CM

## 2022-08-17 DIAGNOSIS — M19.041 PRIMARY OSTEOARTHRITIS, RIGHT HAND: Primary | ICD-10-CM

## 2022-08-17 DIAGNOSIS — M79.641 BILATERAL HAND PAIN: ICD-10-CM

## 2022-08-17 DIAGNOSIS — M19.042 PRIMARY OSTEOARTHRITIS, LEFT HAND: ICD-10-CM

## 2022-08-17 DIAGNOSIS — M18.0 PRIMARY OSTEOARTHRITIS OF BOTH FIRST CARPOMETACARPAL JOINTS: ICD-10-CM

## 2022-08-17 PROCEDURE — G9717 DOC PT DX DEP/BP F/U NT REQ: HCPCS | Performed by: ORTHOPAEDIC SURGERY

## 2022-08-17 PROCEDURE — G8756 NO BP MEASURE DOC: HCPCS | Performed by: ORTHOPAEDIC SURGERY

## 2022-08-17 PROCEDURE — G8536 NO DOC ELDER MAL SCRN: HCPCS | Performed by: ORTHOPAEDIC SURGERY

## 2022-08-17 PROCEDURE — 1101F PT FALLS ASSESS-DOCD LE1/YR: CPT | Performed by: ORTHOPAEDIC SURGERY

## 2022-08-17 PROCEDURE — G8427 DOCREV CUR MEDS BY ELIG CLIN: HCPCS | Performed by: ORTHOPAEDIC SURGERY

## 2022-08-17 PROCEDURE — G8399 PT W/DXA RESULTS DOCUMENT: HCPCS | Performed by: ORTHOPAEDIC SURGERY

## 2022-08-17 PROCEDURE — 1090F PRES/ABSN URINE INCON ASSESS: CPT | Performed by: ORTHOPAEDIC SURGERY

## 2022-08-17 PROCEDURE — 1123F ACP DISCUSS/DSCN MKR DOCD: CPT | Performed by: ORTHOPAEDIC SURGERY

## 2022-08-17 PROCEDURE — 99203 OFFICE O/P NEW LOW 30 MIN: CPT | Performed by: ORTHOPAEDIC SURGERY

## 2022-08-17 PROCEDURE — G8421 BMI NOT CALCULATED: HCPCS | Performed by: ORTHOPAEDIC SURGERY

## 2022-08-17 RX ORDER — ESCITALOPRAM OXALATE 20 MG/1
TABLET ORAL
COMMUNITY
Start: 2022-07-07

## 2022-08-17 RX ORDER — OMEPRAZOLE 20 MG/1
20 CAPSULE, DELAYED RELEASE ORAL DAILY
COMMUNITY
Start: 2022-06-13

## 2022-08-17 RX ORDER — ROSUVASTATIN CALCIUM 10 MG/1
10 TABLET, COATED ORAL DAILY
COMMUNITY
Start: 2022-05-26

## 2022-08-17 RX ORDER — AMLODIPINE BESYLATE 2.5 MG/1
2.5 TABLET ORAL DAILY
COMMUNITY
Start: 2022-05-26

## 2022-08-17 RX ORDER — EZETIMIBE 10 MG/1
10 TABLET ORAL DAILY
COMMUNITY
Start: 2022-07-07

## 2022-08-17 NOTE — PROGRESS NOTES
HPI: Prerna Sharma (: 1945) is a 64 y.o. female, patient, here for evaluation of the following chief complaint(s):    No chief complaint on file. Patient is seen today to evaluate her hands. She has chronic severe bilateral hand osteoarthritis especially with ulnar curvatures of the index and middle finger PIP joints more pronounced on the right than the left side. She has advanced bilateral thumb CMC basal joint arthritis but surprisingly has little pain. She overall has significant IP joint arthritis throughout each hand but is concerned more over the chronic curvature of the right index and middle PIP joints and the difficulty that creates with pinch. Vitals: There were no vitals taken for this visit. There is no height or weight on file to calculate BMI. No Known Allergies    Current Outpatient Medications   Medication Sig    amLODIPine (NORVASC) 2.5 mg tablet Take 2.5 mg by mouth daily. ezetimibe (ZETIA) 10 mg tablet Take 10 mg by mouth daily. rosuvastatin (CRESTOR) 10 mg tablet Take 10 mg by mouth daily. omeprazole (PRILOSEC) 20 mg capsule Take 20 mg by mouth daily. escitalopram oxalate (LEXAPRO) 20 mg tablet TAKE 1 TABLET BY MOUTH ONCE DAILY FOR 90 DAYS    aspirin (ASPIRIN) 325 mg tablet Take 325 mg by mouth daily. butalbital-acetaminophen-caffeine (FIORICET, ESGIC) -40 mg per tablet Take 1 Tab by mouth. simvastatin (ZOCOR) 40 mg tablet 40 mg.    losartan (COZAAR) 100 mg tablet Take 100 mg by mouth every evening. methylphenidate ER 36 mg 24 hr tab Take 36 mg by mouth every morning. fluticasone (FLONASE) 50 mcg/actuation nasal spray 2 Sprays by Both Nostrils route daily. meclizine (ANTIVERT) 25 mg tablet Take 1 Tab by mouth three (3) times daily as needed for 30 doses. No current facility-administered medications for this visit.        Past Medical History:   Diagnosis Date    ADD (attention deficit disorder)     Asthma     chronic cough Cataracts, bilateral     Depression with anxiety     Essential hypertension, benign 10/8/2009    GERD (gastroesophageal reflux disease)     History of uterine fibroid     Hyperlipidemia     Ocular migraine     YOMAIRA (obstructive sleep apnea)     bipap    Osteoarthrosis involving multiple sites 3/4/2013    Osteopenia 10/8/2009    Rosacea     Stroke ([address]     Urge incontinence of urine     Urolithiasis     Urolithiasis         Past Surgical History:   Procedure Laterality Date    COLONOSCOPY N/A 2020    COLONOSCOPY performed by Titi Rosenberg MD at [facility], COLON, DIAGNOSTIC       Dr.Lyons martínez    HX APPENDECTOMY      HX DILATION AND CURETTAGE      HX ORTHOPAEDIC Right 2013    TKR    HX ORTHOPAEDIC Left 2013    TKR    HX TONSILLECTOMY      HX UROLOGICAL      stone removal    [facility] BREAST SURGERY PROCEDURE UNLISTED      biopsy: benign left       Family History   Problem Relation Age of Onset    Arthritis-rheumatoid Mother     Dementia Mother     Cancer Father         prostate,liver    Hypertension Brother     Arthritis-rheumatoid Brother         oste0    Cancer Brother     Hypertension Brother     Hypertension Brother     Stroke Maternal Aunt     Malignant Hyperthermia Neg Hx     Pseudocholinesterase Deficiency Neg Hx     Delayed Awakening Neg Hx     Post-op Nausea/Vomiting Neg Hx     Emergence Delirium Neg Hx     Post-op Cognitive Dysfunction Neg Hx         Social History     Tobacco Use    Smoking status: Former     Years: 3.00     Types: Cigarettes     Quit date: 1963     Years since quittin.9    Smokeless tobacco: Never   Substance Use Topics    Alcohol use: No     Alcohol/week: 0.0 standard drinks    Drug use: No        Review of Systems   All other systems reviewed and are negative.            Physical Exam    The patient has bilateral hand deformities from osteoarthritis with 25 to 30 degree ulnarly angulated PIP joints of the right index and middle finger more severe than on the left side. She cannot make full complete composite fist due to the Rhett Neftali nodes at the DIP PIP joints and advanced arthritis in general.  She complains less involving the right ring and small finger PIP joints. She has chronic dorsal thumb carpometacarpal joint prominence with basal joint arthritis and some secondary mild hyperextension of the MP joints yet is only minimally symptomatic on the left versus the right thumb. Good sensation refill. Imaging:    XR Results (most recent):  Results from Appointment encounter on 08/17/22    XR HAND BILAT AP LAT OBL (MIN 3 V)    Narrative  AP, lateral and oblique x-ray of both hand x-rays were obtained. She has marked severe osteoarthritis with complete joint space collapse of multiple joints including left greater than right thumb CMC and STT joints, left index MP more than other MP joints, symmetric severe PIP DIP arthritis with PIP ulnar curvatures more on the right than left and near complete subluxation right small finger PIP joint. Osteopenia. No fractures. ASSESSMENT/PLAN:  Below is the assessment and plan developed based on review of pertinent history, physical exam, labs, studies, and medications. Patient examination was consistent with bilateral chronic severe hand osteoarthritis with deformities mostly involving the thumb CMC basal joints and especially the right more than left index and middle PIP joints with upwards of 30 degree ulnar deviation curvatures. I reviewed treatment options and answered her questions. While multiple joints radiographically could be treated with fusions and/or joint replacement arthroplasties I feel she would be best served with right index and middle PIP fusions with avoidance of any MP or DIP surgery on the specific fingers. Future consideration could be given for a PIP replacement arthroplasty of the ring finger and the fusion of the PIP of the small finger.   I reviewed risks that include but not limited to stiffness, pain, nerve or tendon damage and overall incomplete relief of pain and nonunion. Arrangements can be made for this to be performed on an outpatient basis at her convenience. 1. Primary osteoarthritis, right hand  2. Right hand pain  3. Primary osteoarthritis, left hand  4. Bilateral hand pain  -     XR HAND BILAT AP LAT OBL (MIN 3 V); Future  5. Primary osteoarthritis of both first carpometacarpal joints      Return for Follow-up 7 to 10 days after surgery. .    An electronic signature was used to authenticate this note.   -- Sandro Mendez MD

## 2022-08-17 NOTE — LETTER
8/17/2022    Patient: Eveline Restrepo   YOB: 1945   Date of Visit: 8/17/2022     Macy Genao MD  [place] [address] [phone]  Via Fax: [phone]    Dear Macy Genao MD,      Thank you for referring Ms. Irina John to [facility] for evaluation. My notes for this consultation are attached. If you have questions, please do not hesitate to call me. I look forward to following your patient along with you.       Sincerely,    Lexi Parada MD

## 2022-09-02 DIAGNOSIS — M19.041 PRIMARY OSTEOARTHRITIS, RIGHT HAND: Primary | ICD-10-CM

## 2022-09-14 DIAGNOSIS — M18.0 PRIMARY OSTEOARTHRITIS OF BOTH FIRST CARPOMETACARPAL JOINTS: Primary | ICD-10-CM

## 2022-09-14 DIAGNOSIS — M19.041 PRIMARY OSTEOARTHRITIS, RIGHT HAND: ICD-10-CM

## 2022-09-14 RX ORDER — HYDROCODONE BITARTRATE AND ACETAMINOPHEN 5; 325 MG/1; MG/1
1 TABLET ORAL
Qty: 15 TABLET | Refills: 0 | Status: SHIPPED | OUTPATIENT
Start: 2022-09-14 | End: 2022-09-17

## 2022-09-14 RX ORDER — HYDROCODONE BITARTRATE AND ACETAMINOPHEN 5; 325 MG/1; MG/1
1 TABLET ORAL
Qty: 15 TABLET | Refills: 0 | Status: SHIPPED | OUTPATIENT
Start: 2022-09-14 | End: 2022-09-14 | Stop reason: SDUPTHER

## 2022-09-21 NOTE — PROGRESS NOTES
HPI: Prerna Sharma (: 1945) is a 64 y.o. female, patient, here for evaluation of the following chief complaint(s):    Surgical Follow-up (Right index and middle finger PIP joint fusions on 9/15/22.)   Patient is seen today to evaluate her hands. She has chronic severe bilateral hand osteoarthritis especially with ulnar curvatures of the index and middle finger PIP joints more pronounced on the right than the left side. She has advanced bilateral thumb CMC basal joint arthritis but surprisingly has little pain. She overall has significant IP joint arthritis throughout each hand but is concerned more over the chronic curvature of the right index and middle PIP joints and the difficulty that creates with pinch. She underwent right index and middle finger PIP fusion surgery on 9/15/2022. Vitals: There were no vitals taken for this visit. There is no height or weight on file to calculate BMI. No Known Allergies    Current Outpatient Medications   Medication Sig    amLODIPine (NORVASC) 2.5 mg tablet Take 2.5 mg by mouth daily. ezetimibe (ZETIA) 10 mg tablet Take 10 mg by mouth daily. rosuvastatin (CRESTOR) 10 mg tablet Take 10 mg by mouth daily. omeprazole (PRILOSEC) 20 mg capsule Take 20 mg by mouth daily. escitalopram oxalate (LEXAPRO) 20 mg tablet TAKE 1 TABLET BY MOUTH ONCE DAILY FOR 90 DAYS    aspirin (ASPIRIN) 325 mg tablet Take 325 mg by mouth daily. butalbital-acetaminophen-caffeine (FIORICET, ESGIC) -40 mg per tablet Take 1 Tab by mouth. simvastatin (ZOCOR) 40 mg tablet 40 mg.    methylphenidate ER 36 mg 24 hr tab Take 36 mg by mouth every morning. fluticasone (FLONASE) 50 mcg/actuation nasal spray 2 Sprays by Both Nostrils route daily. losartan (COZAAR) 100 mg tablet Take 100 mg by mouth every evening. meclizine (ANTIVERT) 25 mg tablet Take 1 Tab by mouth three (3) times daily as needed for 30 doses.      No current facility-administered medications for this visit. Past Medical History:   Diagnosis Date    ADD (attention deficit disorder)     Asthma     chronic cough    Cataracts, bilateral     Depression with anxiety     Essential hypertension, benign 10/8/2009    GERD (gastroesophageal reflux disease)     History of uterine fibroid     Hyperlipidemia     Ocular migraine     YOMAIRA (obstructive sleep apnea)     bipap    Osteoarthrosis involving multiple sites 3/4/2013    Osteopenia 10/8/2009    Rosacea     Stroke ([address]     Urge incontinence of urine     Urolithiasis     Urolithiasis         Past Surgical History:   Procedure Laterality Date    COLONOSCOPY N/A 2020    COLONOSCOPY performed by Yusuf Herrera MD at [address], COLON, DIAGNOSTIC       Dr.Lyons martínez    HX APPENDECTOMY      HX DILATION AND CURETTAGE      HX ORTHOPAEDIC Right 2013    TKR    HX ORTHOPAEDIC Left 2013    TKR    HX TONSILLECTOMY      HX UROLOGICAL      stone removal    [place] BREAST SURGERY PROCEDURE UNLISTED      biopsy: benign left       Family History   Problem Relation Age of Onset    Arthritis-rheumatoid Mother     Dementia Mother     Cancer Father         prostate,liver    Hypertension Brother     Arthritis-rheumatoid Brother         oste0    Cancer Brother     Hypertension Brother     Hypertension Brother     Stroke Maternal Aunt     Malignant Hyperthermia Neg Hx     Pseudocholinesterase Deficiency Neg Hx     Delayed Awakening Neg Hx     Post-op Nausea/Vomiting Neg Hx     Emergence Delirium Neg Hx     Post-op Cognitive Dysfunction Neg Hx         Social History     Tobacco Use    Smoking status: Former     Years: 3.00     Types: Cigarettes     Quit date: 1963     Years since quittin.0    Smokeless tobacco: Never   Substance Use Topics    Alcohol use: No     Alcohol/week: 0.0 standard drinks    Drug use: No        Review of Systems   All other systems reviewed and are negative.            Physical Exam    The right hand index and middle finger wounds are healing well with good position and alignment of the fusion sites. No redness drainage or sign of infection. She has chronic dorsal thumb carpometacarpal joint prominence with basal joint arthritis and some secondary mild hyperextension of the MP joints yet is only minimally symptomatic on the left versus the right thumb. Good sensation refill. Imaging:    XR Results (most recent):  Results from Appointment encounter on 09/22/22    XR HAND RT MIN 3 V    Narrative  AP, lateral and oblique x-ray of the right hand shows the PIP fusions of the index and middle finger thus far progressing in good position and alignment with the known osteoarthritic changes at each joint space including throughout the hand and thumb CMC and PIPs of ring small that are ulnarly angulated and DIPs throughout. ASSESSMENT/PLAN:  Below is the assessment and plan developed based on review of pertinent history, physical exam, labs, studies, and medications. Patient examination was consistent with bilateral chronic severe hand osteoarthritis with deformities mostly involving the thumb CMC basal joints and especially the right more than left index and middle PIP joints with upwards of 30 degree ulnar deviation curvatures. I reviewed treatment options and answered her questions. While multiple joints radiographically could be treated with fusions and/or joint replacement arthroplasties I feel she would be best served with right index and middle PIP fusions with avoidance of any MP or DIP surgery on the specific fingers. Future consideration could be given for a PIP replacement arthroplasty of the ring finger and the fusion of the PIP of the small finger. She underwent right index and middle PIP joint fusions on 9/15/2022. She may continue with roxanna taping and gentle motion then strength recovery. Follow-up in 3 to 4 weeks.     1. Primary osteoarthritis, right hand  -     XR HAND RT MIN 3 V; Future  2. Primary osteoarthritis, left hand  3. Bilateral hand pain  4. Status post finger joint fusion      Return in about 4 weeks (around 10/20/2022). An electronic signature was used to authenticate this note.   -- Sherman Hassan MD

## 2022-09-22 ENCOUNTER — OFFICE VISIT (OUTPATIENT)
Dept: ORTHOPEDIC SURGERY | Age: 77
End: 2022-09-22
Payer: MEDICARE

## 2022-09-22 DIAGNOSIS — M19.041 PRIMARY OSTEOARTHRITIS, RIGHT HAND: Primary | ICD-10-CM

## 2022-09-22 DIAGNOSIS — M79.642 BILATERAL HAND PAIN: ICD-10-CM

## 2022-09-22 DIAGNOSIS — M19.042 PRIMARY OSTEOARTHRITIS, LEFT HAND: ICD-10-CM

## 2022-09-22 DIAGNOSIS — M79.641 BILATERAL HAND PAIN: ICD-10-CM

## 2022-09-22 DIAGNOSIS — Z98.1 STATUS POST FINGER JOINT FUSION: ICD-10-CM

## 2022-09-22 PROCEDURE — 99024 POSTOP FOLLOW-UP VISIT: CPT | Performed by: ORTHOPAEDIC SURGERY

## 2022-09-22 NOTE — LETTER
9/22/2022    Patient: Maria A Santizo   YOB: 1945   Date of Visit: 9/22/2022     Arlin Henderson MD  [address] [place] [phone]  Via Fax: [phone]    Dear Arlin Henderson MD,      Thank you for referring Ms. Aura Jaime to [facility] for evaluation. My notes for this consultation are attached. If you have questions, please do not hesitate to call me. I look forward to following your patient along with you.       Sincerely,    Jake William MD

## 2022-11-14 NOTE — PROGRESS NOTES
HPI: Adrianne Cain (: 1945) is a 64 y.o. female, patient, here for evaluation of the following chief complaint(s):    No chief complaint on file. Patient is seen today to evaluate her hands. She has chronic severe bilateral hand osteoarthritis especially with ulnar curvatures of the index and middle finger PIP joints more pronounced on the right than the left side. She has advanced bilateral thumb CMC basal joint arthritis but surprisingly has little pain. She overall has significant IP joint arthritis throughout each hand but is concerned more over the chronic curvature of the right index and middle PIP joints and the difficulty that creates with pinch. She underwent right index and middle finger PIP fusion surgery on 9/15/2022. Vitals: There were no vitals taken for this visit. There is no height or weight on file to calculate BMI. No Known Allergies    Current Outpatient Medications   Medication Sig    amLODIPine (NORVASC) 2.5 mg tablet Take 2.5 mg by mouth daily. ezetimibe (ZETIA) 10 mg tablet Take 10 mg by mouth daily. rosuvastatin (CRESTOR) 10 mg tablet Take 10 mg by mouth daily. omeprazole (PRILOSEC) 20 mg capsule Take 20 mg by mouth daily. escitalopram oxalate (LEXAPRO) 20 mg tablet TAKE 1 TABLET BY MOUTH ONCE DAILY FOR 90 DAYS    aspirin (ASPIRIN) 325 mg tablet Take 325 mg by mouth daily. butalbital-acetaminophen-caffeine (FIORICET, ESGIC) -40 mg per tablet Take 1 Tab by mouth. simvastatin (ZOCOR) 40 mg tablet 40 mg.    methylphenidate ER 36 mg 24 hr tab Take 36 mg by mouth every morning. fluticasone (FLONASE) 50 mcg/actuation nasal spray 2 Sprays by Both Nostrils route daily. losartan (COZAAR) 100 mg tablet Take 100 mg by mouth every evening. meclizine (ANTIVERT) 25 mg tablet Take 1 Tab by mouth three (3) times daily as needed for 30 doses. No current facility-administered medications for this visit.        Past Medical History:   Diagnosis Date ADD (attention deficit disorder)     Asthma     chronic cough    Cataracts, bilateral     Depression with anxiety     Essential hypertension, benign 10/8/2009    GERD (gastroesophageal reflux disease)     History of uterine fibroid     Hyperlipidemia     Ocular migraine     YOMAIRA (obstructive sleep apnea)     bipap    Osteoarthrosis involving multiple sites 3/4/2013    Osteopenia 10/8/2009    Rosacea     Stroke ([facility] [address]     Urge incontinence of urine     Urolithiasis     Urolithiasis         Past Surgical History:   Procedure Laterality Date    COLONOSCOPY N/A 2020    COLONOSCOPY performed by Emmett Ignacio MD at [address] [facility], COLON, DIAGNOSTIC       Dr.Lyons martínez    HX APPENDECTOMY      HX DILATION AND CURETTAGE      HX ORTHOPAEDIC Right 2013    TKR    HX ORTHOPAEDIC Left 2013    TKR    HX TONSILLECTOMY      HX UROLOGICAL      stone removal    RI BREAST SURGERY PROCEDURE UNLISTED      biopsy: benign left       Family History   Problem Relation Age of Onset    Arthritis-rheumatoid Mother     Dementia Mother     Cancer Father         prostate,liver    Hypertension Brother     Arthritis-rheumatoid Brother         oste0    Cancer Brother     Hypertension Brother     Hypertension Brother     Stroke Maternal Aunt     Malignant Hyperthermia Neg Hx     Pseudocholinesterase Deficiency Neg Hx     Delayed Awakening Neg Hx     Post-op Nausea/Vomiting Neg Hx     Emergence Delirium Neg Hx     Post-op Cognitive Dysfunction Neg Hx         Social History     Tobacco Use    Smoking status: Former     Years: 3.00     Types: Cigarettes     Quit date: 1963     Years since quittin.2    Smokeless tobacco: Never   Substance Use Topics    Alcohol use: No     Alcohol/week: 0.0 standard drinks    Drug use: No        Review of Systems   All other systems reviewed and are negative.            Physical Exam    The right hand index and middle finger wounds are well healed with good position and alignment of the fusion sites. No redness drainage or sign of infection. She has chronic dorsal thumb carpometacarpal joint prominence with basal joint arthritis and some secondary mild hyperextension of the MP joints yet is only minimally symptomatic on the left versus the right thumb. Good sensation refill. Imaging:    XR Results (most recent):  Results from Appointment encounter on 09/22/22    XR HAND RT MIN 3 V    Narrative  AP, lateral and oblique x-ray of the right hand shows the PIP fusions of the index and middle finger thus far progressing in good position and alignment with the known osteoarthritic changes at each joint space including throughout the hand and thumb CMC and PIPs of ring small that are ulnarly angulated and DIPs throughout. ASSESSMENT/PLAN:  Below is the assessment and plan developed based on review of pertinent history, physical exam, labs, studies, and medications. Patient examination was consistent with bilateral chronic severe hand osteoarthritis with deformities mostly involving the thumb CMC basal joints and especially the right more than left index and middle PIP joints with upwards of 30 degree ulnar deviation curvatures. I reviewed treatment options and answered her questions. While multiple joints radiographically could be treated with fusions and/or joint replacement arthroplasties I feel she would be best served with right index and middle PIP fusions with avoidance of any MP or DIP surgery on the specific fingers. Future consideration could be given for a PIP replacement arthroplasty of the ring finger and the fusion of the PIP of the small finger. She underwent right index and middle PIP joint fusions on 9/15/2022. She has good overall MP and DIP motion recovery. Continue with symptomatic care gentle motion and strength.   She wanted to hold off on obtaining more x-rays on 11/15/2022 and plans to return in 6 to 8 weeks when x-rays will be obtained at that visit. Otherwise she is doing very well and will continue with motion and strength. 1. Primary osteoarthritis, right hand  2. Primary osteoarthritis, left hand  3. Bilateral hand pain  4. Status post finger joint fusion  5. Right hand pain  6. Primary osteoarthritis of both first carpometacarpal joints      No follow-ups on file. An electronic signature was used to authenticate this note.   -- Rafi Schofield MD

## 2022-11-15 ENCOUNTER — OFFICE VISIT (OUTPATIENT)
Dept: ORTHOPEDIC SURGERY | Age: 77
End: 2022-11-15
Payer: MEDICARE

## 2022-11-15 VITALS — BODY MASS INDEX: 28.86 KG/M2 | WEIGHT: 147 LBS | HEIGHT: 60 IN

## 2022-11-15 DIAGNOSIS — M79.642 BILATERAL HAND PAIN: ICD-10-CM

## 2022-11-15 DIAGNOSIS — M19.042 PRIMARY OSTEOARTHRITIS, LEFT HAND: ICD-10-CM

## 2022-11-15 DIAGNOSIS — M79.641 BILATERAL HAND PAIN: ICD-10-CM

## 2022-11-15 DIAGNOSIS — M18.0 PRIMARY OSTEOARTHRITIS OF BOTH FIRST CARPOMETACARPAL JOINTS: ICD-10-CM

## 2022-11-15 DIAGNOSIS — M19.041 PRIMARY OSTEOARTHRITIS, RIGHT HAND: Primary | ICD-10-CM

## 2022-11-15 DIAGNOSIS — M79.641 RIGHT HAND PAIN: ICD-10-CM

## 2022-11-15 DIAGNOSIS — Z98.1 STATUS POST FINGER JOINT FUSION: ICD-10-CM

## 2022-11-15 PROCEDURE — 99024 POSTOP FOLLOW-UP VISIT: CPT | Performed by: ORTHOPAEDIC SURGERY

## 2022-11-15 NOTE — LETTER
11/15/2022    Patient: Ginny Hernandez   YOB: 1945   Date of Visit: 11/15/2022     Jessika Small MD  [address]  [address] [facility],[address]  [address] [place] [id number]  Via Fax: [phone]    Dear Jessika Small MD,      Thank you for referring Ms. Cynthia Shane to [facility] for evaluation. My notes for this consultation are attached. If you have questions, please do not hesitate to call me. I look forward to following your patient along with you.       Sincerely,    Pati Mckeon MD

## 2023-01-16 NOTE — PROGRESS NOTES
HPI: Geeta Jack (: 1945) is a 76 y.o. female, patient, here for evaluation of the following chief complaint(s):    Arm Pain (Follow up on PIP joint fusions done 09/15/22)   Patient is seen today to evaluate her hands. She has chronic severe bilateral hand osteoarthritis especially with ulnar curvatures of the index and middle finger PIP joints more pronounced on the right than the left side. She has advanced bilateral thumb CMC basal joint arthritis but surprisingly has little pain. She overall has significant IP joint arthritis throughout each hand but is concerned more over the chronic curvature of the right index and middle PIP joints and the difficulty that creates with pinch. She underwent right index and middle finger PIP fusion surgery on 9/15/2022. Vitals:  Ht 5' (1.524 m)   Wt 147 lb (66.7 kg)   BMI 28.71 kg/m²    Body mass index is 28.71 kg/m². No Known Allergies    Current Outpatient Medications   Medication Sig    amLODIPine (NORVASC) 2.5 mg tablet Take 2.5 mg by mouth daily. ezetimibe (ZETIA) 10 mg tablet Take 10 mg by mouth daily. rosuvastatin (CRESTOR) 10 mg tablet Take 10 mg by mouth daily. omeprazole (PRILOSEC) 20 mg capsule Take 20 mg by mouth daily. escitalopram oxalate (LEXAPRO) 20 mg tablet TAKE 1 TABLET BY MOUTH ONCE DAILY FOR 90 DAYS    aspirin (ASPIRIN) 325 mg tablet Take 325 mg by mouth daily. butalbital-acetaminophen-caffeine (FIORICET, ESGIC) -40 mg per tablet Take 1 Tab by mouth. simvastatin (ZOCOR) 40 mg tablet 40 mg.    methylphenidate ER 36 mg 24 hr tab Take 36 mg by mouth every morning. fluticasone (FLONASE) 50 mcg/actuation nasal spray 2 Sprays by Both Nostrils route daily. losartan (COZAAR) 100 mg tablet Take 100 mg by mouth every evening. meclizine (ANTIVERT) 25 mg tablet Take 1 Tab by mouth three (3) times daily as needed for 30 doses. No current facility-administered medications for this visit.        Past Medical History:   Diagnosis Date    ADD (attention deficit disorder)     Asthma     chronic cough    Cataracts, bilateral     Depression with anxiety     Essential hypertension, benign 10/8/2009    GERD (gastroesophageal reflux disease)     History of uterine fibroid     Hyperlipidemia     Ocular migraine     YOMAIRA (obstructive sleep apnea)     bipap    Osteoarthrosis involving multiple sites 3/4/2013    Osteopenia 10/8/2009    Rosacea     Stroke ([facility] [address]     Urge incontinence of urine     Urolithiasis     Urolithiasis         Past Surgical History:   Procedure Laterality Date    COLONOSCOPY N/A 2020    COLONOSCOPY performed by Beulah Ramirez MD at [address] [facility], COLON, DIAGNOSTIC       Dr.Lyons martínez    HX APPENDECTOMY      HX DILATION AND CURETTAGE      HX ORTHOPAEDIC Right 2013    TKR    HX ORTHOPAEDIC Left 2013    TKR    HX TONSILLECTOMY      HX UROLOGICAL      stone removal    ME UNLISTED PROCEDURE BREAST  1991    biopsy: benign left       Family History   Problem Relation Age of Onset    Arthritis-rheumatoid Mother     Dementia Mother     Cancer Father         prostate,liver    Hypertension Brother     Arthritis-rheumatoid Brother         oste0    Cancer Brother     Hypertension Brother     Hypertension Brother     Stroke Maternal Aunt     Malignant Hyperthermia Neg Hx     Pseudocholinesterase Deficiency Neg Hx     Delayed Awakening Neg Hx     Post-op Nausea/Vomiting Neg Hx     Emergence Delirium Neg Hx     Post-op Cognitive Dysfunction Neg Hx         Social History     Tobacco Use    Smoking status: Former     Years: 3.00     Types: Cigarettes     Quit date: 1963     Years since quittin.3    Smokeless tobacco: Never   Substance Use Topics    Alcohol use: No     Alcohol/week: 0.0 standard drinks    Drug use: No        Review of Systems   All other systems reviewed and are negative.            Physical Exam    The right hand index and middle finger wounds are well healed with good position and alignment of the fusion sites. No redness drainage or sign of infection. She has chronic dorsal thumb carpometacarpal joint prominence with basal joint arthritis and some secondary mild hyperextension of the MP joints yet is only minimally symptomatic on the left versus the right thumb. Good sensation refill. She has ulnar deviation especially of the right ring and small and left index and middle fingers from chronic osteoarthritis and large Heberden and Hari nodes bilaterally. Imaging:    XR Results (most recent):  Results from Appointment encounter on 01/17/23    XR HAND BILAT AP LAT OBL (MIN 3 V)    Narrative  AP, lateral and oblique x-ray of both hands were obtained. She has healing fusions of the right index and middle finger PIP joints each with 6-hole dorsal plate and screws in place. There is advanced arthrosis and virtually every joint including bilateral thumb CMC and STT as well as left index MP and all PIP DIP joints including marked ulnar deviation of the left index and middle PIP more than the right ring and small PIP. No fractures. ASSESSMENT/PLAN:  Below is the assessment and plan developed based on review of pertinent history, physical exam, labs, studies, and medications. Patient examination was consistent with bilateral chronic severe hand osteoarthritis with deformities mostly involving the thumb CMC basal joints and especially the right more than left index and middle PIP joints with upwards of 30 degree ulnar deviation curvatures. I reviewed treatment options and answered her questions. While multiple joints radiographically could be treated with fusions and/or joint replacement arthroplasties I feel she would be best served with right index and middle PIP fusions with avoidance of any MP or DIP surgery on the specific fingers.   Future consideration could be given for a PIP replacement arthroplasty of the ring finger and the fusion of the PIP of the small finger. She underwent right index and middle PIP joint fusions on 9/15/2022. She has good overall MP and DIP motion recovery. Continue with symptomatic care gentle motion and strength. She has progressive healing of the fusions of the right index and middle finger PIP joints as of radiographic assessment on 1/17/2023. She is considering similar surgery to the left hand but currently wants to wait until September. The right index finger has some ulnar prominence at the PIP joint and a limited craterization type procedure of the proximal phalanx could be performed as well. Lastly, we spoke about treating the ulnar deviated right ring and small fingers with fusion and/or joint replacement arthroplasty but for now she is relatively asymptomatic. 1. Primary osteoarthritis, right hand  -     XR HAND BILAT AP LAT OBL (MIN 3 V); Future  2. Primary osteoarthritis, left hand  -     XR HAND BILAT AP LAT OBL (MIN 3 V); Future  3. Primary osteoarthritis of both first carpometacarpal joints  4. Bilateral hand pain  5. Status post finger joint fusion  6. Right hand pain      Return in about 3 months (around 4/17/2023). An electronic signature was used to authenticate this note.   -- Juan Rosales MD

## 2023-01-17 ENCOUNTER — OFFICE VISIT (OUTPATIENT)
Dept: ORTHOPEDIC SURGERY | Age: 78
End: 2023-01-17
Payer: MEDICARE

## 2023-01-17 VITALS — HEIGHT: 60 IN | BODY MASS INDEX: 28.86 KG/M2 | WEIGHT: 147 LBS

## 2023-01-17 DIAGNOSIS — Z98.1 STATUS POST FINGER JOINT FUSION: ICD-10-CM

## 2023-01-17 DIAGNOSIS — M19.041 PRIMARY OSTEOARTHRITIS, RIGHT HAND: Primary | ICD-10-CM

## 2023-01-17 DIAGNOSIS — M79.642 BILATERAL HAND PAIN: ICD-10-CM

## 2023-01-17 DIAGNOSIS — M79.641 RIGHT HAND PAIN: ICD-10-CM

## 2023-01-17 DIAGNOSIS — M79.641 BILATERAL HAND PAIN: ICD-10-CM

## 2023-01-17 DIAGNOSIS — M19.042 PRIMARY OSTEOARTHRITIS, LEFT HAND: ICD-10-CM

## 2023-01-17 DIAGNOSIS — M18.0 PRIMARY OSTEOARTHRITIS OF BOTH FIRST CARPOMETACARPAL JOINTS: ICD-10-CM

## 2023-01-17 NOTE — LETTER
1/17/2023    Patient: Jeremy Parra   YOB: 1945   Date of Visit: 1/17/2023     Sean Harp MD  [address]  [address] [place] [address],[address]  [address] [facility]  Via Fax: [phone]    Dear Sean Harp MD,      Thank you for referring Ms. Del Blackman to [facility] for evaluation. My notes for this consultation are attached. If you have questions, please do not hesitate to call me. I look forward to following your patient along with you.       Sincerely,    Otis Delarosa MD

## 2024-05-26 ENCOUNTER — HOSPITAL ENCOUNTER (EMERGENCY)
Facility: HOSPITAL | Age: 79
Discharge: HOME | End: 2024-05-26
Attending: STUDENT IN AN ORGANIZED HEALTH CARE EDUCATION/TRAINING PROGRAM
Payer: MEDICARE

## 2024-05-26 VITALS
BODY MASS INDEX: 29 KG/M2 | HEART RATE: 87 BPM | TEMPERATURE: 98 F | RESPIRATION RATE: 16 BRPM | HEIGHT: 60 IN | OXYGEN SATURATION: 94 % | WEIGHT: 147.71 LBS | SYSTOLIC BLOOD PRESSURE: 171 MMHG | DIASTOLIC BLOOD PRESSURE: 75 MMHG

## 2024-05-26 DIAGNOSIS — M25.511 CHRONIC RIGHT SHOULDER PAIN: Primary | ICD-10-CM

## 2024-05-26 DIAGNOSIS — G89.29 CHRONIC RIGHT SHOULDER PAIN: Primary | ICD-10-CM

## 2024-05-26 PROCEDURE — 6370000000 HC RX 637 (ALT 250 FOR IP): Performed by: STUDENT IN AN ORGANIZED HEALTH CARE EDUCATION/TRAINING PROGRAM

## 2024-05-26 PROCEDURE — 99283 EMERGENCY DEPT VISIT LOW MDM: CPT

## 2024-05-26 RX ORDER — METHOCARBAMOL 500 MG/1
500 TABLET, FILM COATED ORAL 3 TIMES DAILY
COMMUNITY
Start: 2024-05-24

## 2024-05-26 RX ORDER — MELOXICAM 7.5 MG/1
7.5 TABLET ORAL DAILY
Qty: 30 TABLET | Refills: 0 | Status: SHIPPED | OUTPATIENT
Start: 2024-05-26

## 2024-05-26 RX ORDER — LIDOCAINE 4 G/G
4 PATCH TOPICAL EVERY 12 HOURS
COMMUNITY
Start: 2024-05-24

## 2024-05-26 RX ORDER — HYDROCODONE BITARTRATE AND ACETAMINOPHEN 5; 325 MG/1; MG/1
1 TABLET ORAL
Status: COMPLETED | OUTPATIENT
Start: 2024-05-26 | End: 2024-05-26

## 2024-05-26 RX ADMIN — HYDROCODONE BITARTRATE AND ACETAMINOPHEN 1 TABLET: 5; 325 TABLET ORAL at 07:31

## 2024-05-26 ASSESSMENT — PAIN DESCRIPTION - DESCRIPTORS: DESCRIPTORS: ACHING

## 2024-05-26 ASSESSMENT — PAIN DESCRIPTION - PAIN TYPE: TYPE: CHRONIC PAIN

## 2024-05-26 ASSESSMENT — PAIN SCALES - GENERAL: PAINLEVEL_OUTOF10: 4

## 2024-05-26 ASSESSMENT — PAIN - FUNCTIONAL ASSESSMENT: PAIN_FUNCTIONAL_ASSESSMENT: 0-10

## 2024-05-26 ASSESSMENT — PAIN DESCRIPTION - LOCATION: LOCATION: SHOULDER

## 2024-05-26 ASSESSMENT — PAIN DESCRIPTION - ORIENTATION: ORIENTATION: RIGHT

## 2024-05-26 NOTE — DISCHARGE INSTRUCTIONS
You may continue to take your prescribed Robaxin, lidocaine patches, and Tylenol. Please return to the emergency department if you experience increased swelling, uncontrolled pain, new rash, skin discoloration, or other new and concerning symptom.

## 2024-05-26 NOTE — ED PROVIDER NOTES
[facility] EMERGENCY DEPT  EMERGENCY DEPARTMENT ENCOUNTER      Pt Name: Guerita Curtis  MRN: [id number]  Birthdate 1945  Date of evaluation: 5/26/2024  Provider: Judith Manuel MD    CHIEF COMPLAINT       Chief Complaint   Patient presents with    Shoulder Pain         HISTORY OF PRESENT ILLNESS    Review of Medical Records: N/A    Nursing Triage Notes were reviewed.    HPI    Guerita Curtis is a 78 y.o. female with a history of rotator cuff tear and right shoulder osteoarthritis who presents to the emergency department for evaluation of acute on chronic right shoulder pain.  Patient states that she was diagnosed with a rotator cuff tear approximately 3 years ago and was recommended to have a shoulder replacement for her osteoarthritis, but declined at that time.  States that for the past week she has had a flare of her right shoulder pain.  Complains primarily of frequent, severe spasms and sharp pain in her right shoulder. Reports she was seen at [facility] earlier this week and prescribed Robaxin and lidocaine patches without relief. Had an X ray at that visit without acute findings.  Pain is generalized and she denies any point tenderness.  Denies any associated paresthesias, weakness, or limited range of motion.  Denies any history of kidney disease, gastritis, PUD, or blood thinner use.        PAST MEDICAL HISTORY     Past Medical History:   Diagnosis Date    ADD (attention deficit disorder)     Asthma     chronic cough    Cataracts, bilateral     Depression with anxiety     Essential hypertension, benign 10/8/2009    GERD (gastroesophageal reflux disease)     History of uterine fibroid     Hyperlipidemia     Ocular migraine     RADHA (obstructive sleep apnea)     bipap    Osteoarthrosis involving multiple sites 3/4/2013    Osteopenia 10/8/2009    Rosacea     Stroke (HCC)     Urge incontinence of urine     Urolithiasis     Urolithiasis          SURGICAL HISTORY       Past Surgical History:

## 2024-05-26 NOTE — ED TRIAGE NOTES
Pt ambulates to treatment area she states that for the past week she has had right shoulder pain that radiates down the right arm.  She has had a rotator cuff issue with this same shoulder and arthritis in the past so she thinks she is having the same issue.  She went to [facility] to be checked and they gave her Robaxin and Lidocaine patches that are not helping.

## (undated) DEVICE — Device

## (undated) DEVICE — ADULT SPO2 SENSOR: Brand: NELLCOR

## (undated) DEVICE — SOLIDIFIER MEDC 1200ML -- CONVERT TO 356117

## (undated) DEVICE — POLYP TRAP: Brand: TRAPEASE®

## (undated) DEVICE — ELECTRODE,RADIOTRANSLUCENT,FOAM,3PK: Brand: MEDLINE

## (undated) DEVICE — FORCEPS BX L240CM JAW DIA2.8MM L CAP W/ NDL MIC MESH TOOTH

## (undated) DEVICE — BAG BELONG PT PERS CLEAR HANDL

## (undated) DEVICE — CANNULA CUSH AD W/ 14FT TBG

## (undated) DEVICE — CONTAINER SPEC 20 ML LID NEUT BUFF FORMALIN 10 % POLYPR STS

## (undated) DEVICE — SNARE ENDOSCP M L240CM W27MM SHTH DIA2.4MM CHN 2.8MM OVL

## (undated) DEVICE — BITEBLOCK ENDOSCP 60FR MAXI WHT POLYETH STURDY W/ VELC WVN

## (undated) DEVICE — CUFF BLD PRSS AD SZ 11 FOR 25-34CM 1 TB TRIPURPOSE CONN

## (undated) DEVICE — NDL PRT INJ NSAF BLNT 18GX1.5 --

## (undated) DEVICE — SYR 3ML LL TIP 1/10ML GRAD --

## (undated) DEVICE — SYR 5ML 1/5 GRAD LL NSAF LF --

## (undated) DEVICE — BAG SPEC BIOHZRD 10 X 10 IN --

## (undated) DEVICE — BASIN EMSIS 16OZ GRAPHITE PLAS KID SHP MOLD GRAD FOR ORAL

## (undated) DEVICE — SET ADMIN 16ML TBNG L100IN 2 Y INJ SITE IV PIGGY BK DISP

## (undated) DEVICE — CATH IV AUTOGRD BC BLU 22GA 25 -- INSYTE

## (undated) DEVICE — 1200 GUARD II KIT W/5MM TUBE W/O VAC TUBE: Brand: GUARDIAN

## (undated) DEVICE — KIT COLON W/ 1.1OZ LUB AND 2 END